# Patient Record
Sex: FEMALE | Race: WHITE
[De-identification: names, ages, dates, MRNs, and addresses within clinical notes are randomized per-mention and may not be internally consistent; named-entity substitution may affect disease eponyms.]

---

## 2018-08-22 ENCOUNTER — HOSPITAL ENCOUNTER (EMERGENCY)
Dept: HOSPITAL 44 - ED | Age: 38
Discharge: HOME | End: 2018-08-22
Payer: COMMERCIAL

## 2018-08-22 VITALS — SYSTOLIC BLOOD PRESSURE: 133 MMHG | DIASTOLIC BLOOD PRESSURE: 91 MMHG

## 2018-08-22 DIAGNOSIS — B02.9: Primary | ICD-10-CM

## 2018-08-22 NOTE — ED PHYSICIAN DOCUMENTATION
General Adult





- HISTORIAN


Historian: patient





- HPI


Stated Complaint: possible shingles rash


Chief Complaint: Skin Rash


Onset: days ago (5)


Timing: still present


Severity: mild


Further Comments: yes (Shes states over the weekend she noticed a feeling of a 

rash on her left lateral side under breast wrapping around. However no rash was 

present. She did start taking her  gabapentin after there was a burning 

sensation. 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 She now has some 

bumps of a rash on the left lateral side)


Last known Well Code/Unknown Code: Unknown





- ROS


CONST: no problems


EYES/ENT: none


CVS/RESP: none


GI/: none


MS/SKIN/LYMPH: rash


NEURO/PSYCH: headache





- PAST HX


Past History: none


Surgeries/Procedures: , other (ortho )


Immunizations: UTD


Allergies/Adverse Reactions: 


                                    Allergies











Allergy/AdvReac Type Severity Reaction Status Date / Time


 


No Known Drug Allergies AdvReac Unknown No Reaction Verified 18 19:44














Home Medications: 


                                Ambulatory Orders











 Medication  Instructions  Recorded


 


NK  18














- SOCIAL HX


Smoking History: non-smoker


Alcohol Use: none


Drug Use: none





- FAMILY HX


Family History: No





- REVIEWED ASSESSMENTS


Nursing Assessment  Reviewed: Yes


Vitals Reviewed: Yes





General Adult Physical Exam





- PHYSICAL EXAM


GENERAL APPEARANCE: no distress


EENT: eye inspection normal, ENT inspection normal


NECK: normal inspection


RESPIRATORY: no resp distress, chest non-tender, breath sounds normal


CVS: reg rate & rhythm, heart sounds normal, equal pulses


ABDOMEN: soft, normal bowel sounds


BACK: normal inspection


SKIN: warm/dry, other (several small red raised area on left lateral side )


EXTREMITIES: non-tender, normal range of motion, no evidence of injury, no edema


NEURO: oriented X3, CN's nml as tested, sensation nml, mood/affect nml, 

cognition normal





Discharge


Clincal Impression: 


Shingles outbreak


Qualifiers:


 Herpes zoster complications: without complications Qualified Code(s): B02.9 - 

Zoster without complications





Referrals: 


Marta Ye MD [Primary Care Provider] - 2 Days


Comments: 





1. Acyclovir 800 mg take 1 by mouth five times per day x 7 days


2. Gabapentin 300 mg take 1 by mouth twice per day x 10 


3. OTC meds as needed for pain 


4. Keep area clean and dry


5. Return to PCP in 2-4 days if increase in symptoms


6. Return to ER for any concerns 


Condition: Stable


Disposition: 01 HOME, SELF-CARE


Decision to Admit: NO


Date of Decison to Admit: 18


Decision Time: 19:54